# Patient Record
Sex: FEMALE | Race: ASIAN
[De-identification: names, ages, dates, MRNs, and addresses within clinical notes are randomized per-mention and may not be internally consistent; named-entity substitution may affect disease eponyms.]

---

## 2017-09-11 ENCOUNTER — HOSPITAL ENCOUNTER (OUTPATIENT)
Dept: HOSPITAL 10 - LAB | Age: 47
Discharge: HOME | End: 2017-09-11
Attending: INTERNAL MEDICINE
Payer: COMMERCIAL

## 2017-09-11 DIAGNOSIS — E03.9: Primary | ICD-10-CM

## 2017-09-11 DIAGNOSIS — R73.03: ICD-10-CM

## 2017-09-11 DIAGNOSIS — E78.5: ICD-10-CM

## 2017-09-11 LAB
ADD UMIC: NO
ALBUMIN SERPL-MCNC: 4.1 G/DL (ref 3.3–4.9)
ALBUMIN/GLOB SERPL: 1.32 {RATIO}
ALP SERPL-CCNC: 55 IU/L (ref 42–121)
ALT SERPL-CCNC: 31 IU/L (ref 13–69)
ANION GAP SERPL CALC-SCNC: 12 MMOL/L (ref 8–16)
AST SERPL-CCNC: 18 IU/L (ref 15–46)
BASOPHILS # BLD AUTO: 0 10^3/UL (ref 0–0.1)
BASOPHILS NFR BLD: 0.3 % (ref 0–2)
BILIRUB DIRECT SERPL-MCNC: 0 MG/DL (ref 0–0.2)
BILIRUB SERPL-MCNC: 0.6 MG/DL (ref 0.2–1.3)
BUN SERPL-MCNC: 8 MG/DL (ref 7–20)
CALCIUM SERPL-MCNC: 9.5 MG/DL (ref 8.4–10.2)
CHLORIDE SERPL-SCNC: 105 MMOL/L (ref 97–110)
CHOLEST SERPL-MCNC: 133 MG/DL (ref 100–200)
CHOLEST/HDLC SERPL: 2.1 RATIO
CO2 SERPL-SCNC: 27 MMOL/L (ref 21–31)
COLOR UR: YELLOW
CREAT SERPL-MCNC: 0.68 MG/DL (ref 0.44–1)
EOSINOPHIL # BLD: 0.2 10^3/UL (ref 0–0.5)
EOSINOPHIL NFR BLD: 3.1 % (ref 0–7)
ERYTHROCYTE [DISTWIDTH] IN BLOOD BY AUTOMATED COUNT: 11.9 % (ref 11.5–14.5)
GLOBULIN SER-MCNC: 3.1 G/DL (ref 1.3–3.2)
GLUCOSE SERPL-MCNC: 91 MG/DL (ref 70–220)
GLUCOSE UR STRIP-MCNC: NEGATIVE MG/DL
HCT VFR BLD CALC: 37.1 % (ref 37–47)
HDLC SERPL-MCNC: 62 MG/DL (ref 34–88)
HGB BLD-MCNC: 12.2 G/DL (ref 12–16)
KETONES UR STRIP.AUTO-MCNC: NEGATIVE MG/DL
LYMPHOCYTES # BLD AUTO: 1.5 10^3/UL (ref 0.8–2.9)
LYMPHOCYTES NFR BLD AUTO: 25.2 % (ref 15–51)
MCH RBC QN AUTO: 29.5 PG (ref 29–33)
MCHC RBC AUTO-ENTMCNC: 32.9 G/DL (ref 32–37)
MCV RBC AUTO: 89.6 FL (ref 82–101)
MONOCYTES # BLD: 0.6 10^3/UL (ref 0.3–0.9)
MONOCYTES NFR BLD: 10.3 % (ref 0–11)
NEUTROPHILS NFR BLD AUTO: 60.9 % (ref 39–77)
NITRITE UR QL STRIP.AUTO: NEGATIVE MG/DL
NRBC # BLD MANUAL: 0 10^3/UL (ref 0–0)
NRBC BLD AUTO-RTO: 0 /100WBC (ref 0–0)
PLATELET # BLD: 250 10^3/UL (ref 140–415)
PMV BLD AUTO: 9.6 FL (ref 7.4–10.4)
POTASSIUM SERPL-SCNC: 4.2 MMOL/L (ref 3.5–5.1)
PROT SERPL-MCNC: 7.2 G/DL (ref 6.1–8.1)
RBC # BLD AUTO: 4.14 10^6/UL (ref 4.2–5.4)
RBC # UR AUTO: NEGATIVE MG/DL
SODIUM SERPL-SCNC: 140 MMOL/L (ref 135–144)
TRIGL SERPL-MCNC: 50 MG/DL (ref 0–149)
TSH SERPL-ACNC: 1.39 MIU/L (ref 0.47–4.68)
UR ASCORBIC ACID: NEGATIVE MG/DL
UR BILIRUBIN (DIP): NEGATIVE MG/DL
UR CLARITY: CLEAR
UR PH (DIP): 8 (ref 5–9)
UR SPECIFIC GRAVITY (DIP): 1.01 (ref 1–1.03)
UR TOTAL PROTEIN (DIP): NEGATIVE MG/DL
UROBILINOGEN UR STRIP-ACNC: NEGATIVE MG/DL
WBC # BLD AUTO: 5.8 10^3/UL (ref 4.8–10.8)
WBC # UR STRIP: NEGATIVE LEU/UL

## 2017-09-11 PROCEDURE — 84436 ASSAY OF TOTAL THYROXINE: CPT

## 2017-09-11 PROCEDURE — 80053 COMPREHEN METABOLIC PANEL: CPT

## 2017-09-11 PROCEDURE — 81003 URINALYSIS AUTO W/O SCOPE: CPT

## 2017-09-11 PROCEDURE — 85025 COMPLETE CBC W/AUTO DIFF WBC: CPT

## 2017-09-11 PROCEDURE — 84443 ASSAY THYROID STIM HORMONE: CPT

## 2017-09-11 PROCEDURE — 83036 HEMOGLOBIN GLYCOSYLATED A1C: CPT

## 2017-09-11 PROCEDURE — 80061 LIPID PANEL: CPT

## 2018-10-21 ENCOUNTER — HOSPITAL ENCOUNTER (EMERGENCY)
Age: 48
Discharge: HOME | End: 2018-10-21

## 2018-10-21 ENCOUNTER — HOSPITAL ENCOUNTER (EMERGENCY)
Dept: HOSPITAL 91 - FTE | Age: 48
Discharge: HOME | End: 2018-10-21
Payer: COMMERCIAL

## 2018-10-21 DIAGNOSIS — R10.2: Primary | ICD-10-CM

## 2018-10-21 LAB — URINE PH (DIP) POC: 6.5 (ref 5–8.5)

## 2018-10-21 PROCEDURE — 81003 URINALYSIS AUTO W/O SCOPE: CPT

## 2018-10-21 PROCEDURE — 99284 EMERGENCY DEPT VISIT MOD MDM: CPT

## 2018-10-21 PROCEDURE — 76856 US EXAM PELVIC COMPLETE: CPT

## 2019-03-01 ENCOUNTER — HOSPITAL ENCOUNTER (EMERGENCY)
Dept: HOSPITAL 10 - E/R | Age: 49
Discharge: HOME | End: 2019-03-01
Payer: COMMERCIAL

## 2019-03-01 ENCOUNTER — HOSPITAL ENCOUNTER (EMERGENCY)
Dept: HOSPITAL 91 - E/R | Age: 49
Discharge: HOME | End: 2019-03-01
Payer: COMMERCIAL

## 2019-03-01 VITALS
BODY MASS INDEX: 21.18 KG/M2 | BODY MASS INDEX: 21.18 KG/M2 | HEIGHT: 62 IN | WEIGHT: 115.08 LBS | WEIGHT: 115.08 LBS | HEIGHT: 62 IN

## 2019-03-01 VITALS — DIASTOLIC BLOOD PRESSURE: 45 MMHG | RESPIRATION RATE: 20 BRPM | SYSTOLIC BLOOD PRESSURE: 127 MMHG | HEART RATE: 58 BPM

## 2019-03-01 DIAGNOSIS — R53.1: ICD-10-CM

## 2019-03-01 DIAGNOSIS — R40.2362: ICD-10-CM

## 2019-03-01 DIAGNOSIS — Z86.73: ICD-10-CM

## 2019-03-01 DIAGNOSIS — R40.2142: ICD-10-CM

## 2019-03-01 DIAGNOSIS — R40.2252: ICD-10-CM

## 2019-03-01 DIAGNOSIS — R20.2: Primary | ICD-10-CM

## 2019-03-01 LAB
ADD MAN DIFF?: NO
ANION GAP: 9 (ref 5–13)
BASOPHIL #: 0 10^3/UL (ref 0–0.1)
BASOPHILS %: 0.6 % (ref 0–2)
BLOOD UREA NITROGEN: 13 MG/DL (ref 7–20)
CALCIUM: 9.4 MG/DL (ref 8.4–10.2)
CARBON DIOXIDE: 25 MMOL/L (ref 21–31)
CHLORIDE: 107 MMOL/L (ref 97–110)
CHOL/HDL RATIO: 2.7 RATIO
CHOLESTEROL: 152 MG/DL (ref 100–200)
CREATININE: 0.69 MG/DL (ref 0.44–1)
EOSINOPHILS #: 0.1 10^3/UL (ref 0–0.5)
EOSINOPHILS %: 1.3 % (ref 0–7)
GLUCOSE: 144 MG/DL (ref 70–220)
HDL CHOLESTEROL: 55 MG/DL (ref 37–92)
HEMATOCRIT: 38.7 % (ref 37–47)
HEMOGLOBIN A1C: 5.5 % (ref 0–5.9)
HEMOGLOBIN: 12.4 G/DL (ref 12–16)
IMMATURE GRANS #M: 0.01 10^3/UL (ref 0–0.03)
IMMATURE GRANS % (M): 0.2 % (ref 0–0.43)
INR: 0.95
LDL CHOLESTEROL,CALCULATED: 84 MG/DL
LYMPHOCYTES #: 1.6 10^3/UL (ref 0.8–2.9)
LYMPHOCYTES %: 33.2 % (ref 15–51)
MEAN CORPUSCULAR HEMOGLOBIN: 28.1 PG (ref 29–33)
MEAN CORPUSCULAR HGB CONC: 32 G/DL (ref 32–37)
MEAN CORPUSCULAR VOLUME: 87.6 FL (ref 82–101)
MEAN PLATELET VOLUME: 9.7 FL (ref 7.4–10.4)
MONOCYTE #: 0.4 10^3/UL (ref 0.3–0.9)
MONOCYTES %: 7.7 % (ref 0–11)
NEUTROPHIL #: 2.7 10^3/UL (ref 1.6–7.5)
NEUTROPHILS %: 57 % (ref 39–77)
NUCLEATED RED BLOOD CELLS #: 0 10^3/UL (ref 0–0)
NUCLEATED RED BLOOD CELLS%: 0 /100WBC (ref 0–0)
PARTIAL THROMBOPLASTIN TIME: 29 SEC (ref 23–35)
PLATELET COUNT: 290 10^3/UL (ref 140–415)
POTASSIUM: 3.9 MMOL/L (ref 3.5–5.1)
PROTIME: 12.8 SEC (ref 11.9–14.9)
PT RATIO: 1
RED BLOOD COUNT: 4.42 10^6/UL (ref 4.2–5.4)
RED CELL DISTRIBUTION WIDTH: 12.1 % (ref 11.5–14.5)
SODIUM: 141 MMOL/L (ref 135–144)
TRIGLYCERIDES: 67 MG/DL (ref 0–149)
TROPONIN-I: < 0.012 NG/ML (ref 0–0.12)
WHITE BLOOD COUNT: 4.8 10^3/UL (ref 4.8–10.8)

## 2019-03-01 PROCEDURE — 82962 GLUCOSE BLOOD TEST: CPT

## 2019-03-01 PROCEDURE — 80048 BASIC METABOLIC PNL TOTAL CA: CPT

## 2019-03-01 PROCEDURE — 80061 LIPID PANEL: CPT

## 2019-03-01 PROCEDURE — 99285 EMERGENCY DEPT VISIT HI MDM: CPT

## 2019-03-01 PROCEDURE — 70544 MR ANGIOGRAPHY HEAD W/O DYE: CPT

## 2019-03-01 PROCEDURE — 85025 COMPLETE CBC W/AUTO DIFF WBC: CPT

## 2019-03-01 PROCEDURE — 93005 ELECTROCARDIOGRAM TRACING: CPT

## 2019-03-01 PROCEDURE — 70549 MR ANGIOGRAPH NECK W/O&W/DYE: CPT

## 2019-03-01 PROCEDURE — 71045 X-RAY EXAM CHEST 1 VIEW: CPT

## 2019-03-01 PROCEDURE — 81025 URINE PREGNANCY TEST: CPT

## 2019-03-01 PROCEDURE — 70551 MRI BRAIN STEM W/O DYE: CPT

## 2019-03-01 PROCEDURE — 85610 PROTHROMBIN TIME: CPT

## 2019-03-01 PROCEDURE — 85730 THROMBOPLASTIN TIME PARTIAL: CPT

## 2019-03-01 PROCEDURE — 84484 ASSAY OF TROPONIN QUANT: CPT

## 2019-03-01 PROCEDURE — 83036 HEMOGLOBIN GLYCOSYLATED A1C: CPT

## 2019-03-01 PROCEDURE — 36415 COLL VENOUS BLD VENIPUNCTURE: CPT

## 2019-03-01 NOTE — ERD
ER Documentation


Chief Complaint


Chief Complaint





pt bib self with c/o right arm weakness x 1 wk





HPI


49-year-old female who presents to the emergency room complaining of 


paresthesias and possible weakness to her right arm.  Symptoms present for 


approximately 5-7 days.  The heaviness and paresthesia has been worse over the 


past 48 hours.  Patient denies any clumsiness.  She is right-hand dominant.  No 


fevers chills chest pain or shortness of breath.  No headaches.  No slurred 


speech.  No other motor deficits.  She denies any exertional symptoms, no 


pleuritic pain.





ROS


All systems reviewed and are negative except as per history of present illness.





Medications


Home Meds


Active Scripts


Ibuprofen* (Motrin*) 400 Mg Tab, 400 MG PO Q8, #20 TAB


   Prov:NOAH MELVIN MD         10/21/18





Allergies


Allergies:  


Coded Allergies:  


     No Known Drug Allergies (Verified  Allergy, Mild, 11/17/16)





PMhx/Soc


History of Surgery:  Yes (c section x 2,D AND C X2,TUBAL LIGATION)


Anesthesia Reaction:  No


Hx Neurological Disorder:  No


Hx Respiratory Disorders:  No


Hx Cardiac Disorders:  No


Hx Psychiatric Problems:  No


Hx Miscellaneous Medical Probl:  No


Hx Alcohol Use:  No


Hx Substance Use:  No


Hx Tobacco Use:  No





FmHx


Family History:  No diabetes





Physical Exam


Vitals





Vital Signs


  Date      Temp  Pulse  Resp  B/P (MAP)   Pulse Ox  O2          O2 Flow    FiO2


Time                                                 Delivery    Rate


    3/1/19  98.3     62    20      113/52        98  Room Air          2.0


     12:30                           (72)


    3/1/19  98.3     68    20      127/64        98  Room Air          2.0


     11:30                           (85)


    3/1/19                                           Nasal


     11:08                                           Cannula


    3/1/19                                           Nasal               2


     10:54                                           Cannula


    3/1/19  98.1    142    18      167/83       100


     10:05                          (111)





Physical Exam


General: Well developed, well nourished, no acute distress


Head: Normocephalic, atraumatic.


Eyes: Pupils equally reactive, EOM intact


ENT: Moist mucous membranes


Neck: Supple, no lymphadenopathy


Respiratory: Lungs clear bilaterally, no distress


Cardiovascular: RRR, no murmurs, rubs, or gallops


Abdominal: Soft, non-tender, non-distended, no peritoneal signs


: Deferred


MSK: No edema, no unilateral swelling, 5/5 strength


Neurologic: Alert and oriented, moving all extremities, normal speech, no focal 


weakness, no cerebellar signs, no pronator drift, 5 out of 5 strength of the 


right upper extremity.  No significant sensory deficits noted.  NIHSS of 0


Skin: No rash


Psych: Normal mood


Result Diagram:  


3/1/19 1050                                                                     


          3/1/19 1050





Results 24 hrs





Laboratory Tests


Test
                                  3/1/19
10:50  3/1/19
10:58  3/1/19
11:10


White Blood Count                      4.8 10^3/ul


Red Blood Count                       4.42 10^6/ul


Hemoglobin                               12.4 g/dl


Hematocrit                                  38.7 %


Mean Corpuscular Volume                    87.6 fl


Mean Corpuscular Hemoglobin                28.1 pg


Mean Corpuscular Hemoglobin
Concent     32.0 g/dl 
  
             



Red Cell Distribution Width                 12.1 %


Platelet Count                         290 10^3/UL


Mean Platelet Volume                        9.7 fl


Immature Granulocytes %                    0.200 %


Neutrophils %                               57.0 %


Lymphocytes %                               33.2 %


Monocytes %                                  7.7 %


Eosinophils %                                1.3 %


Basophils %                                  0.6 %


Nucleated Red Blood Cells %            0.0 /100WBC


Immature Granulocytes #              0.010 10^3/ul


Neutrophils #                          2.7 10^3/ul


Lymphocytes #                          1.6 10^3/ul


Monocytes #                            0.4 10^3/ul


Eosinophils #                          0.1 10^3/ul


Basophils #                            0.0 10^3/ul


Nucleated Red Blood Cells #            0.0 10^3/ul


Prothrombin Time                          12.8 Sec


Prothrombin Time Ratio                         1.0


INR International Normalized
Ratio           0.95 
  
             



Activated Partial
Thromboplast Time      29.0 Sec 
  
             



Sodium Level                            141 mmol/L


Potassium Level                         3.9 mmol/L


Chloride Level                          107 mmol/L


Carbon Dioxide Level                     25 mmol/L


Anion Gap                                        9


Blood Urea Nitrogen                       13 mg/dl


Creatinine                              0.69 mg/dl


Est Glomerular Filtrat Rate
mL/min   > 60 mL/min 
   
             



Glucose Level                            144 mg/dl


Hemoglobin A1c                               5.5 %


Calcium Level                            9.4 mg/dl


Troponin I                           < 0.012 ng/ml


Triglycerides Level                       67 mg/dl


Cholesterol Level                        152 mg/dl


LDL Cholesterol, Calculated               84 mg/dl


HDL Cholesterol                           55 mg/dl


Cholesterol/HDL Ratio                    2.7 RATIO


Bedside Glucose                                        125 mg/dL


POC Beta HCG, Qualitative                                          NEGATIVE








Procedures/MDM


EKG, MONITORS, & DIAGNOSTIC IMAGING:


EKG: I reviewed and interpreted a 12-lead EKG. 


Rhythm: Normal sinus rhythm


ST Changes: No contiguous ST segment elevations


T waves: No contiguous T wave inversions


Impression: No evidence of acute cardiac ischemia





Chest x-ray: I reviewed and interpreted a 1 view of the chest


Mediastinum: No enlargement


Cardiac silhouette: No cardiomegaly


Airspace: Clear lung fields bilaterally without evidence of pneumothorax


Bones: No evidence of fracture








MRI, MRA head and neck


PENDING





LAB INTERPRETATION:


I reviewed the laboratory testing and it shows no evidence of acute process





MEDICAL DECISION MAKING:


The patient's presentation is very nonspecific.  The patient does describe 


subjective paresthesias to the right upper extremity.  It does not appear to be 


consistent with cervical radiculopathy.  Patient is 49 years old and seems 


unlikely to be related to acute coronary syndrome with no other risk factors for


acute coronary syndrome or cardiac etiology.  EKG and troponin would be 


reasonable given duration of symptoms no indication for hospitalization.  Stroke


seems unlikely but given the patient's unilateral symptoms it would be 


reasonable for further evaluation.  Given the patient's lower risk profile do 


not believe that inpatient hospitalization is necessary.  The patient would 


benefit from MRI imaging of the head and neck.  If no acute process is noted, 


the patient can be safely discharged with outpatient follow-up for paresthesia 


of the right upper extremity.





ER COURSE:


* Initial laboratory testing and diagnostic imaging is negative.  If MRI imaging


  is negative the patient can be safely discharged home with a diagnosis of 


  idiopathic paresthesia with primary care follow-up.


* Patient endorsed oncoming provider to follow-up on MRI imaging





CONSULTATION:


None





DISPOSITION PLAN:


Pending MRI.





Departure


Diagnosis:  


   Primary Impression:  


   Paresthesia of right upper extremity


Condition:  Stable











CAILIN APPLE MD           Mar 1, 2019 12:18

## 2019-03-20 ENCOUNTER — HOSPITAL ENCOUNTER (EMERGENCY)
Dept: HOSPITAL 91 - E/R | Age: 49
Discharge: HOME | End: 2019-03-20
Payer: COMMERCIAL

## 2019-03-20 ENCOUNTER — HOSPITAL ENCOUNTER (EMERGENCY)
Dept: HOSPITAL 10 - E/R | Age: 49
Discharge: HOME | End: 2019-03-20
Payer: COMMERCIAL

## 2019-03-20 VITALS — DIASTOLIC BLOOD PRESSURE: 51 MMHG | SYSTOLIC BLOOD PRESSURE: 111 MMHG | RESPIRATION RATE: 15 BRPM | HEART RATE: 65 BPM

## 2019-03-20 VITALS
HEIGHT: 62 IN | HEIGHT: 62 IN | WEIGHT: 114.24 LBS | BODY MASS INDEX: 21.02 KG/M2 | BODY MASS INDEX: 21.02 KG/M2 | WEIGHT: 114.24 LBS

## 2019-03-20 DIAGNOSIS — N93.8: Primary | ICD-10-CM

## 2019-03-20 DIAGNOSIS — R10.2: ICD-10-CM

## 2019-03-20 LAB
ADD MAN DIFF?: NO
ANION GAP: 11 (ref 5–13)
BASOPHIL #: 0 10^3/UL (ref 0–0.1)
BASOPHILS %: 0.4 % (ref 0–2)
BLOOD UREA NITROGEN: 10 MG/DL (ref 7–20)
CALCIUM: 9.5 MG/DL (ref 8.4–10.2)
CARBON DIOXIDE: 27 MMOL/L (ref 21–31)
CHLORIDE: 102 MMOL/L (ref 97–110)
CREATININE: 0.61 MG/DL (ref 0.44–1)
EOSINOPHILS #: 0.1 10^3/UL (ref 0–0.5)
EOSINOPHILS %: 2 % (ref 0–7)
GLUCOSE: 100 MG/DL (ref 70–220)
HEMATOCRIT: 35.3 % (ref 37–47)
HEMOGLOBIN: 11.3 G/DL (ref 12–16)
IMMATURE GRANS #M: 0.02 10^3/UL (ref 0–0.03)
IMMATURE GRANS % (M): 0.3 % (ref 0–0.43)
LYMPHOCYTES #: 2.1 10^3/UL (ref 0.8–2.9)
LYMPHOCYTES %: 30.2 % (ref 15–51)
MEAN CORPUSCULAR HEMOGLOBIN: 29 PG (ref 29–33)
MEAN CORPUSCULAR HGB CONC: 32 G/DL (ref 32–37)
MEAN CORPUSCULAR VOLUME: 90.5 FL (ref 82–101)
MEAN PLATELET VOLUME: 9.5 FL (ref 7.4–10.4)
MONOCYTE #: 0.7 10^3/UL (ref 0.3–0.9)
MONOCYTES %: 10.6 % (ref 0–11)
NEUTROPHIL #: 3.9 10^3/UL (ref 1.6–7.5)
NEUTROPHILS %: 56.5 % (ref 39–77)
NUCLEATED RED BLOOD CELLS #: 0 10^3/UL (ref 0–0)
NUCLEATED RED BLOOD CELLS%: 0 /100WBC (ref 0–0)
PLATELET COUNT: 284 10^3/UL (ref 140–415)
POTASSIUM: 4.3 MMOL/L (ref 3.5–5.1)
RED BLOOD COUNT: 3.9 10^6/UL (ref 4.2–5.4)
RED CELL DISTRIBUTION WIDTH: 12.7 % (ref 11.5–14.5)
SODIUM: 140 MMOL/L (ref 135–144)
WHITE BLOOD COUNT: 6.9 10^3/UL (ref 4.8–10.8)

## 2019-03-20 PROCEDURE — 76856 US EXAM PELVIC COMPLETE: CPT

## 2019-03-20 PROCEDURE — 99285 EMERGENCY DEPT VISIT HI MDM: CPT

## 2019-03-20 PROCEDURE — 76830 TRANSVAGINAL US NON-OB: CPT

## 2019-03-20 PROCEDURE — 84703 CHORIONIC GONADOTROPIN ASSAY: CPT

## 2019-03-20 PROCEDURE — 80048 BASIC METABOLIC PNL TOTAL CA: CPT

## 2019-03-20 PROCEDURE — 85025 COMPLETE CBC W/AUTO DIFF WBC: CPT

## 2019-03-20 PROCEDURE — 36415 COLL VENOUS BLD VENIPUNCTURE: CPT

## 2019-03-20 RX ADMIN — LORAZEPAM 1 MG: 0.5 TABLET ORAL at 19:06

## 2019-03-20 RX ADMIN — THIAMINE HYDROCHLORIDE 1 MLS/HR: 100 INJECTION, SOLUTION INTRAMUSCULAR; INTRAVENOUS at 19:07

## 2019-03-20 RX ADMIN — HYDROCODONE BITARTRATE AND ACETAMINOPHEN 1 TAB: 5; 325 TABLET ORAL at 19:07

## 2019-03-20 NOTE — ERD
ER Documentation


Chief Complaint


Chief Complaint








HPI


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


ng of acute onset of dizziness, following by significant vaginal bleeding while 


the patient was working during her shift.  The patient reports a history of 


uterine biopsy on 3/12/19, since the procedure, the patient has been 


experiencing intermittent episodes of vaginal bleeding, associated with lower 


back pain.  The patient took Provera, prescribed by her gynecologist.  


Currently, she denies chest pain, no shortness of breath, no dizziness, no fever


or chills.





ROS


All systems reviewed and are negative except as per history of present illness.





Medications


Home Meds


Active Scripts


Hydrocodone/Acetaminophen (Norco 5-325 Tablet) 1 Each Tablet, 1 TAB PO BID PRN 


for PAIN, #7 TAB


   Prov:NOAH MELVIN MD         3/20/19


Reported Medications


Ergocalciferol (Vitamin D2) (VITAMIN D2) 50,000 Unit Capsule, 27320 UNIT PO WE


EKLY, CAP


   3/1/19


Medroxyprogesterone Acetate* (Medroxyprogesterone Acetate*) 10 Mg Tablet, 10 MG 


PO DAILY, TAB


   3/1/19





Allergies


Allergies:  


Coded Allergies:  


     No Known Drug Allergies (Verified  Allergy, Mild, 3/20/19)





PMhx/Soc


History of Surgery:  Yes (c section x 2,D AND C X2,TUBAL LIGATION)


Anesthesia Reaction:  No


Hx Neurological Disorder:  No


Hx Respiratory Disorders:  No


Hx Cardiac Disorders:  No


Hx Psychiatric Problems:  No


Hx Miscellaneous Medical Probl:  No


Hx Alcohol Use:  No


Hx Substance Use:  No


Hx Tobacco Use:  No





Physical Exam


Vitals





Vital Signs


  Date      Temp  Pulse  Resp  B/P (MAP)   Pulse Ox  O2          O2 Flow    FiO2


Time                                                 Delivery    Rate


   3/20/19           88    20      133/83       100  Room Air


     18:22                          (100)


   3/20/19  98.4    135    20      151/68        98


     18:05                           (95)





Physical Exam


Const:   No acute distress


Head:   Atraumatic 


Eyes:    Normal Conjunctiva


ENT:    Normal External Ears, Nose and Mouth.


Neck:               Full range of motion. No meningismus.


Resp:   Clear to auscultation bilaterally


Cardio:   Regular rate and rhythm, no murmurs


Abd:    Soft, non tender, non distended. Normal bowel sounds


: Deferred at this time.


Skin:   No petechiae or rashes


Back:   No midline or flank tenderness


Ext:    No cyanosis, or edema


Neur:   Awake and alert


Psych:    Normal Mood and Affect


Result Diagram:  


3/20/19 1844                                                                    


           3/20/19 1844





Results 24 hrs





Laboratory Tests


              Test
                                 3/20/19
18:44


              White Blood Count                      6.9 10^3/ul


              Red Blood Count                       3.90 10^6/ul


              Hemoglobin                               11.3 g/dl


              Hematocrit                                  35.3 %


              Mean Corpuscular Volume                    90.5 fl


              Mean Corpuscular Hemoglobin                29.0 pg


              Mean Corpuscular Hemoglobin
Concent     32.0 g/dl 



              Red Cell Distribution Width                 12.7 %


              Platelet Count                         284 10^3/UL


              Mean Platelet Volume                        9.5 fl


              Immature Granulocytes %                    0.300 %


              Neutrophils %                               56.5 %


              Lymphocytes %                               30.2 %


              Monocytes %                                 10.6 %


              Eosinophils %                                2.0 %


              Basophils %                                  0.4 %


              Nucleated Red Blood Cells %            0.0 /100WBC


              Immature Granulocytes #              0.020 10^3/ul


              Neutrophils #                          3.9 10^3/ul


              Lymphocytes #                          2.1 10^3/ul


              Monocytes #                            0.7 10^3/ul


              Eosinophils #                          0.1 10^3/ul


              Basophils #                            0.0 10^3/ul


              Nucleated Red Blood Cells #            0.0 10^3/ul


              Sodium Level                            140 mmol/L


              Potassium Level                         4.3 mmol/L


              Chloride Level                          102 mmol/L


              Carbon Dioxide Level                     27 mmol/L


              Anion Gap                                       11


              Blood Urea Nitrogen                       10 mg/dl


              Creatinine                              0.61 mg/dl


              Est Glomerular Filtrat Rate
mL/min   > 60 mL/min 



              Glucose Level                            100 mg/dl


              Calcium Level                            9.5 mg/dl


              Serum HCG, Qualitative               NEGATIVE





Current Medications


 Medications
   Dose
          Sig/Sue
       Start Time
   Status  Last


 (Trade)       Ordered        Route
 PRN     Stop Time              Admin
Dose


                              Reason                                Admin


 Sodium         1,000 ml @ 
   Q1H STAT
      3/20/19       DC           3/20/19


Chloride       1,000 mls/hr   IV
            18:22
                       19:07



                                             3/20/19 19:21


                1 tab          ONCE  ONCE
    3/20/19       DC           3/20/19


Acetaminophen                 PO
            18:30
                       19:07



/
                                           3/20/19 18:57


Hydrocodone


Bitart



(Norco


(5/325))


 Lorazepam
     0.5 mg         ONCE  ONCE
    3/20/19       DC           3/20/19


(Ativan)                      PO
            19:00
                       19:06



                                             3/20/19 19:01





Patient: AUBREE CALLAWAY YOB: 1970   Age: 49  Sex: F                        


MR #:    [id number]   Acct #:   [id number]    DOS: 19 1822


Ordering MD: NOAH MELVIN MD   Location:  [facility]   Room/Bed:            


                               





PROCEDURE:   US Pelvis. 


 


CLINICAL INDICATION:   pelvic pain 


 


TECHNIQUE:   Multiple sonographic images of the pelvis were obtained utilizing 


transabdominal and endovaginal technique.   The images were reviewed on a PACS 


workstation. 


 


COMPARISON:   10/21/18 


 


FINDINGS:


 


The uterus is normal in size with a heterogeneous appearance of the myometrium. 


The uterus measures 7.9 x 6.3 x 6.2 cm. There is a 1 cm hypoechoic mass within 


the uterus, suspicious for fibroid..


The endometrial stripe is homogeneous in appearance and has the thickness of 8 


mm.


 


The ovaries are normal in size and echogenicity. Normal Doppler flow is 


identified in both ovaries.


The right ovary measures 4.8 x 2.7 x 3.2 cm. There is a 4 cm simple cyst in the 


right ovary.


The left ovary measures 3.1 x 1.7 x 2.6 cm.


 


No free fluid is present within the pelvis.


 


RPTAT: AA


 


IMPRESSION:


 


Small fibroid within the uterus.


4 cm simple cyst in the right ovary.





Procedures/MDM


Vital signs stable, Physical exam unremarkable, abdomen soft, nontender.  


Patient hemodynamically stable.


Differential diagnosis include but not limited to: Pregnancy, ovarian cyst, 


fibroids, endometriosis, malignancy, dysfunctional bleeding, hematologic 


condition.  Low suspicion for PID, no signs of hypovolemic shock.


Physical examination and clinical presentation consistent most likely with 


dysfunctional uterine bleeding.


During the ED course the patient remained stable, no new complaints. 


Results and clinical impression discussed with patient who agrees with 


management. The patient is stable to be treated outpatient and will be 


discharged home with a Rx for Norco, some side effects of prescribed medications


(headache, rash, nausea, vomiting, diarrhea, drowsiness, habituation, bleeding, 


hypertension, interactions with other medications) were reviewed.





The patient was instructed to follow up with the primary care provider in the 


next 48h.  If symptoms persist, worsen or new symptoms develop, then patient 


should return to the ED immediately.





Instructions explained and given directly by me to the patient with 


acknowledgment and demonstrated understanding.





Disclaimer: Inadvertent spelling and grammatical errors are likely due to 


EHR/dictation software use and do not reflect on the overall quality of patient 


care. Also, please note that the electronic time recorded on this note does not 


necessarily reflect the actual time of the patient encounter.





Departure


Diagnosis:  


   Primary Impression:  


   Dysfunctional uterine bleeding


Condition:  Stable





Additional Instructions:  


Thank you very much for allowing us to participate in your care. 


Your health and safety is our top priority at [facility].





Call your primary care doctor TOMORROW for an appointment during the next 2-4 


days and bring all the information and medications prescribed. 





Have prescriptions filled and follow precisely the directions on the label. 





If the symptoms get worse and your provider is unavailable, return to the 


Emergency Department immediately.











NOAH MELVIN MD      Mar 20, 2019 18:27